# Patient Record
Sex: FEMALE | Race: WHITE | NOT HISPANIC OR LATINO | Employment: FULL TIME | URBAN - NONMETROPOLITAN AREA
[De-identification: names, ages, dates, MRNs, and addresses within clinical notes are randomized per-mention and may not be internally consistent; named-entity substitution may affect disease eponyms.]

---

## 2017-01-19 ENCOUNTER — OFFICE VISIT (OUTPATIENT)
Dept: ORTHOPEDIC SURGERY | Facility: CLINIC | Age: 42
End: 2017-01-19

## 2017-01-19 DIAGNOSIS — S82.892D CLOSED LEFT ANKLE FRACTURE, WITH ROUTINE HEALING, SUBSEQUENT ENCOUNTER: ICD-10-CM

## 2017-01-19 DIAGNOSIS — S82.892D ANKLE FRACTURE, LEFT, CLOSED, WITH ROUTINE HEALING, SUBSEQUENT ENCOUNTER: Primary | ICD-10-CM

## 2017-01-19 PROCEDURE — 73600 X-RAY EXAM OF ANKLE: CPT | Performed by: ORTHOPAEDIC SURGERY

## 2017-01-19 PROCEDURE — 99024 POSTOP FOLLOW-UP VISIT: CPT | Performed by: ORTHOPAEDIC SURGERY

## 2017-01-19 RX ORDER — BACLOFEN 10 MG/1
10 TABLET ORAL 2 TIMES DAILY
Qty: 60 TABLET | Refills: 0 | Status: SHIPPED | OUTPATIENT
Start: 2017-01-19

## 2017-01-19 NOTE — PROGRESS NOTES
Chief Complaint   Patient presents with   • Left Ankle - Follow-up   • Wound Check         HPI  Patient is here today for a follow up of hr left ankle fracture and incisional wound.  She has a minimal amount of drainage in the center of the lateral incision.  She is extremely tender there also.  Overall the patient is doing reasonably well at this point.  Her pain is starting to get a little bit better and the swelling and bruising of gone down considerably.  There is one small area of incision that is not quite healed completely it is at the level of the syndesmosis and possibly the site of maximal swelling and stretching of the skin at the time of the trauma.        There were no vitals filed for this visit.        Joint/Body Part Specific Exam: Left ankle:  Patient is post ORIF of the ankle 8 week(s). Incisions are clean and healing nicely both medially and laterally. Ankle mortise is stable. There is some stiffness at the ankle consistent with the post-surgical and post-   immobilization status of the patient. There is no clinical deformity. Homans   sign is negative. Calf is soft and nontender. Patient is moving their toes well.   Local tenderness is consistent with a healing fracture. Dorsalis pedis artery   pulses are palpable. No hardware related problems are noted.        X-RAY REPORT:  left Ankle X-Ray  Indication: Evaluation of healing of the ankle fracture after ORIF  Views: AP, Lateral, Mortise  Findings: Well reduced ankle fracture anatomically speaking  yes fracture  no bony lesion  Soft tissues within normal limits  within normal limits joint spaces  Hardware appropriately positioned yes    yes prior studies available for comparison.    X-RAY was ordered and reviewed by Db Wing MD        Dev was seen today for wound check and follow-up.    Diagnoses and all orders for this visit:    Ankle fracture, left, closed, with routine healing, subsequent encounter    Closed left ankle fracture, with  routine healing, subsequent encounter          Procedures        Instructions:      Plan:    Incision care with aqua cell dressing.    Elevation to control swelling.    Ace wrap from toes to groin.    Note for physical therapy given to the patient including the use of Thera-Band exercises.    Tablet baclofen 10 mg tab 1 by mouth twice a day when necessary spasms in the calf and the hamstring muscles.    Possibility of removal of the syndesmotic screw discussed with the patient and her  to improve dorsiflexion of the ankle and to promote healing of the one area of incision that is not completely healed.    She has completed her course of doxycycline for antibiotic prophylaxis.    Patient is given a note to be off work at this point.    She can start weightbearing as tolerated.    Falls precaution.    Tablet aspirin 325 mg tab 1 by mouth daily for DVT prophylaxis.    Follow-up in my office in 6 weeks for reevaluation.    Controlled substance treatment options discussed in detail. Patient's signed consent to medical options on file. NANETTE form in chart. Risks of narcotic medication usage outlined.  Possibility of physical and psychological dependence and abuse, especially long term, emphasized to the patient.

## 2017-01-19 NOTE — MR AVS SNAPSHOT
Dev Ramos   1/19/2017 9:30 AM   Office Visit    Dept Phone:  [phone]   Encounter #:  [id number]    Provider:  Db Wing MD   Department:  [facility]                Your Full Care Plan              Today's Medication Changes          These changes are accurate as of: 1/19/17 10:10 AM.  If you have any questions, ask your nurse or doctor.               New Medication(s)Ordered:     baclofen 10 MG tablet   Commonly known as:  LIORESAL   Take 1 tablet by mouth 2 (Two) Times a Day.   Started by:  Db Wing MD            Where to Get Your Medications      These medications were sent to [facility] [address] [place] [address] - [address] - [phone]  - [phone]   [address] [place] [address]     Phone:  [phone]     baclofen 10 MG tablet                  Your Updated Medication List          This list is accurate as of: 1/19/17 10:10 AM.  Always use your most recent med list.                baclofen 10 MG tablet   Commonly known as:  LIORESAL   Take 1 tablet by mouth 2 (Two) Times a Day.       cyclobenzaprine 10 MG tablet   Commonly known as:  FLEXERIL   Take 1 tablet by mouth Every 12 (Twelve) Hours As Needed for muscle spasms.       oxyCODONE-acetaminophen 5-325 MG per tablet   Commonly known as:  PERCOCET   1 Oral Q12H PRN severe pain       sulfamethoxazole-trimethoprim 800-160 MG per tablet   Commonly known as:  BACTRIM DS   Take 1 tablet by mouth 2 (Two) Times a Day.               We Performed the Following     XR Ankle 2 View Left       You Were Diagnosed With        Codes Comments    Ankle fracture, left, closed, with routine healing, subsequent encounter    -  Primary ICD-10-CM: S82.892D  ICD-9-CM: V54.19     Closed left ankle fracture, with routine healing, subsequent encounter     ICD-10-CM: S82.892D  ICD-9-CM: V54.19       Instructions     None    Patient Instructions  History      Upcoming Appointments     Visit Type Date Time Department    FOLLOW UP 2017  9:30 AM [facility]    FOLLOW UP 3/2/2017  9:30 AM [facility]      [employer] Signup     [facility] [employer] allows you to send messages to your doctor, view your test results, renew your prescriptions, schedule appointments, and more. To sign up, go to [employer] and click on the Sign Up Now link in the New User? box. Enter your [employer] Activation Code exactly as it appears below along with the last four digits of your Social Security Number and your Date of Birth () to complete the sign-up process. If you do not sign up before the expiration date, you must request a new code.    [employer] Activation Code: [email or web address]  Expires: 2017 10:10 AM    If you have questions, you can email [email or web address] or call [phone] to talk to our [employer] staff. Remember, [employer] is NOT to be used for urgent needs. For medical emergencies, dial 911.               Other Info from Your Visit           Your Appointments     Mar 02, 2017  9:30 AM EST   Follow Up with Db Wing MD   [facility] (--)    [address] [place] [address]   [phone]           Arrive 15 minutes prior to appointment.              Allergies     No Known Allergies      Reason for Visit     Left Ankle - Follow-up     Wound Check           Vital Signs     Smoking Status                   Never Smoker           Problems and Diagnoses Noted     Fracture of left ankle    Broken ankle      Results     XR Ankle 2 View Left

## 2017-02-01 NOTE — TELEPHONE ENCOUNTER
Please have a Script written and printed. Please have patient come in and pick it up tablet Norco 5/325 mg tab 1 by mouth every 6 hours when necessary pain total 40 pills no refills thanks.  SM

## 2017-02-02 RX ORDER — HYDROCODONE BITARTRATE AND ACETAMINOPHEN 5; 325 MG/1; MG/1
1 TABLET ORAL EVERY 6 HOURS PRN
Qty: 40 TABLET | Refills: 0 | Status: SHIPPED | OUTPATIENT
Start: 2017-02-02

## 2017-02-06 ENCOUNTER — TELEPHONE (OUTPATIENT)
Dept: ORTHOPEDIC SURGERY | Facility: CLINIC | Age: 42
End: 2017-02-06

## 2017-02-06 NOTE — TELEPHONE ENCOUNTER
PER DAVID BUSTILLO PATIENT'S INCISION IS STILL NOT HEALED AND PATIENT IS REQUESTING SCREW BE REMOVED PLEASE PUT IN THE SX ORDER.

## 2017-02-08 ENCOUNTER — PREP FOR SURGERY (OUTPATIENT)
Dept: ORTHOPEDIC SURGERY | Facility: CLINIC | Age: 42
End: 2017-02-08

## 2017-02-08 DIAGNOSIS — S82.892S: Primary | ICD-10-CM

## 2017-02-15 ENCOUNTER — DOCUMENTATION (OUTPATIENT)
Dept: ORTHOPEDIC SURGERY | Facility: CLINIC | Age: 42
End: 2017-02-15

## 2017-02-15 RX ORDER — SULFAMETHOXAZOLE AND TRIMETHOPRIM 800; 160 MG/1; MG/1
1 TABLET ORAL 2 TIMES DAILY
Qty: 30 TABLET | Refills: 0 | Status: SHIPPED | OUTPATIENT
Start: 2017-02-15

## 2017-02-15 NOTE — PROGRESS NOTES
Patient came in today for a wound check.  The area is beginning to show redness.  It also looks like it is getting larger in size.  Upon review with Dr. Wing, he has sent orders to [facility] to schedule screw removal and at that time he will clean out the area.  He also ordered Bactrim for the patient.  It has been sent to [facility] pharmacy.  The wound was redressed and patient will continue to dress it until surgery.

## 2017-02-27 ENCOUNTER — OUTSIDE FACILITY SERVICE (OUTPATIENT)
Dept: ORTHOPEDIC SURGERY | Facility: CLINIC | Age: 42
End: 2017-02-27

## 2017-02-27 PROCEDURE — 20680 REMOVAL OF IMPLANT DEEP: CPT | Performed by: ORTHOPAEDIC SURGERY

## 2017-03-02 ENCOUNTER — OFFICE VISIT (OUTPATIENT)
Dept: ORTHOPEDIC SURGERY | Facility: CLINIC | Age: 42
End: 2017-03-02

## 2017-03-02 DIAGNOSIS — S82.892D CLOSED LEFT ANKLE FRACTURE, WITH ROUTINE HEALING, SUBSEQUENT ENCOUNTER: Primary | ICD-10-CM

## 2017-03-02 PROCEDURE — 99024 POSTOP FOLLOW-UP VISIT: CPT | Performed by: ORTHOPAEDIC SURGERY

## 2017-03-02 NOTE — PROGRESS NOTES
Chief Complaint   Patient presents with   • Left Ankle - Follow-up   • Wound Check         HPI the patient is here today for a follow up of her left ankle she had hardware removed on 2/27/17 when we removed a single syndesmotic screw and perform a secondary closure on the wound dehiscence.  No fevers or chills reported by patient.  She feels like her range of motion is improved significantly.  She feels the ankle is not bound down by the syndesmotic fixation anymore.  She is pleased with her outcome.        There were no vitals filed for this visit.        Joint/Body Part Specific Exam:  Left ankle: Patient is 3 days status post removal of his syndesmotic screw.  She is neurovascularly intact.  Incision is clean.  Dorsiflexion 0-20° and plantar flexion 0-30°.  She already feels that the ankle range of motion is more free and her range of motion is better especially in dorsiflexion and rotation after the syndesmotic screw removal.  Homans sign is negative.  There is no clinical evidence of a DVT or an infection.      X-RAY REPORT:        Dev was seen today for wound check and follow-up.    Diagnoses and all orders for this visit:    Closed left ankle fracture, with routine healing, subsequent encounter          Procedures        Instructions:      Plan: Incision care.    Elevation to control swelling.    Protected weightbearing with the use of crutches or a cane.    Patient already has a prescription of Percocet.    Ace wrap from toes to groin to help decrease the swelling and decrease the possibility of a DVT.    Continue with active gentle mobilization of the ankle including circumduction.    Tablet aspirin 325 mg tab 1 by mouth daily for DVT prophylaxis.    Follow-up in my office in 6 weeks for reevaluation and possible removal of sutures.

## 2017-03-16 ENCOUNTER — OFFICE VISIT (OUTPATIENT)
Dept: ORTHOPEDIC SURGERY | Facility: CLINIC | Age: 42
End: 2017-03-16

## 2017-03-16 DIAGNOSIS — S82.892D CLOSED LEFT ANKLE FRACTURE, WITH ROUTINE HEALING, SUBSEQUENT ENCOUNTER: Primary | ICD-10-CM

## 2017-03-16 PROCEDURE — 99024 POSTOP FOLLOW-UP VISIT: CPT | Performed by: ORTHOPAEDIC SURGERY

## 2017-03-16 NOTE — PROGRESS NOTES
Chief Complaint   Patient presents with   • Left Ankle - Follow-up         HPI  The patient is here today for a follow up of her left ankle. She had hardware removal on 2/27/17.  Patient feels a lot better after the removal of the syndesmotic screw.  Her range of motion is improved significantly.  She feels that the ankle is a lot more free and then she can mobilize it much more extensively and it has helped to improve her gait pattern as well.  She does not have any complications from the surgical intervention where the syndesmotic screw was removed to a half weeks ago.  There were no vitals filed for this visit.        Joint/Body Part Specific Exam:  Left ankle: Incisions are clean fully healed dorsiflexion 0-30° and this is about 10° more than the contralateral side.  Plantar flexion 0-40° which is also 10° more than the contralateral side.  She is able to circumduct the ankle without too much difficulty.  Neurovascular status is intact.  There is no clinical deformity.  The syndesmosis is stable.  No hardware related problems are noted.      X-RAY REPORT:        Dev was seen today for follow-up.    Diagnoses and all orders for this visit:    Closed left ankle fracture, with routine healing, subsequent encounter          Procedures        Instructions:      Plan: Incision care.    Elevation to control swelling.    Home-based exercise program.    Weight bearing as tolerated.    DC the sutures today in the office.    Okay to return to her job at [employer] [place].  Restrictions will be 4 hours a day and the patient is instructed to elevate the lower extremity to minimize swelling every shift for about 20-30 minutes.    She is not asking me for any pain medication at this point.    Follow-up in 6 weeks.

## 2017-03-23 ENCOUNTER — TELEPHONE (OUTPATIENT)
Dept: ORTHOPEDIC SURGERY | Facility: CLINIC | Age: 42
End: 2017-03-23

## 2017-03-23 RX ORDER — SULFAMETHOXAZOLE AND TRIMETHOPRIM 800; 160 MG/1; MG/1
1 TABLET ORAL 2 TIMES DAILY
Qty: 30 TABLET | Refills: 0 | Status: SHIPPED | OUTPATIENT
Start: 2017-03-23

## 2017-03-23 RX ORDER — FLUCONAZOLE 150 MG/1
150 TABLET ORAL ONCE
Qty: 3 TABLET | Refills: 0 | Status: SHIPPED | OUTPATIENT
Start: 2017-03-23 | End: 2017-03-23

## 2017-03-23 NOTE — TELEPHONE ENCOUNTER
Patient came into the office.  Her incision from the screw removal has scabbed over and there is redness surrounding it.  The scab was removed and an antimicrobial dressing with silver applied.  Bactrim has been refilled for the patient to prevent infection. Patient will remove the dressing on Sunday and report findings on Monday.

## 2017-04-19 ENCOUNTER — TELEPHONE (OUTPATIENT)
Dept: ORTHOPEDIC SURGERY | Facility: CLINIC | Age: 42
End: 2017-04-19

## 2017-04-20 ENCOUNTER — TELEPHONE (OUTPATIENT)
Dept: ORTHOPEDIC SURGERY | Facility: CLINIC | Age: 42
End: 2017-04-20

## 2017-04-20 RX ORDER — HYDROCODONE BITARTRATE AND ACETAMINOPHEN 5; 325 MG/1; MG/1
1 TABLET ORAL EVERY 12 HOURS PRN
Qty: 30 TABLET | Refills: 0 | Status: SHIPPED | OUTPATIENT
Start: 2017-04-20

## 2017-04-20 NOTE — TELEPHONE ENCOUNTER
Patient has been set up with wound care at [facility] with Gina Chavez.  Patient is scheduled for 04/25/17 at 11:30 a.m. She has been instructed that she will need a [employer] referral from her PCP.

## 2017-04-27 ENCOUNTER — OFFICE VISIT (OUTPATIENT)
Dept: ORTHOPEDIC SURGERY | Facility: CLINIC | Age: 42
End: 2017-04-27

## 2017-04-27 DIAGNOSIS — S82.892D CLOSED LEFT ANKLE FRACTURE, WITH ROUTINE HEALING, SUBSEQUENT ENCOUNTER: Primary | ICD-10-CM

## 2017-04-27 PROCEDURE — 99213 OFFICE O/P EST LOW 20 MIN: CPT | Performed by: ORTHOPAEDIC SURGERY

## 2017-04-27 PROCEDURE — 73600 X-RAY EXAM OF ANKLE: CPT | Performed by: ORTHOPAEDIC SURGERY

## 2017-05-01 ENCOUNTER — OFFICE VISIT (OUTPATIENT)
Dept: ORTHOPEDIC SURGERY | Facility: CLINIC | Age: 42
End: 2017-05-01

## 2017-05-01 DIAGNOSIS — S82.892D ANKLE FRACTURE, LEFT, CLOSED, WITH ROUTINE HEALING, SUBSEQUENT ENCOUNTER: Primary | ICD-10-CM

## 2017-05-01 DIAGNOSIS — T81.31XD WOUND DEHISCENCE, SUBSEQUENT ENCOUNTER: ICD-10-CM

## 2017-05-01 PROCEDURE — 99024 POSTOP FOLLOW-UP VISIT: CPT | Performed by: ORTHOPAEDIC SURGERY

## 2017-05-01 RX ORDER — TRAZODONE HYDROCHLORIDE 50 MG/1
TABLET ORAL
COMMUNITY
Start: 2017-04-20

## 2017-05-01 RX ORDER — FLUCONAZOLE 150 MG/1
150 TABLET ORAL ONCE
Qty: 3 TABLET | Refills: 0 | Status: SHIPPED | OUTPATIENT
Start: 2017-05-01 | End: 2017-05-01

## 2017-05-01 RX ORDER — TRIAMTERENE AND HYDROCHLOROTHIAZIDE 37.5; 25 MG/1; MG/1
CAPSULE ORAL
COMMUNITY
Start: 2017-04-10

## 2017-05-01 RX ORDER — HYDROCODONE BITARTRATE AND ACETAMINOPHEN 5; 325 MG/1; MG/1
1 TABLET ORAL EVERY 6 HOURS PRN
Qty: 40 TABLET | Refills: 0 | Status: SHIPPED | OUTPATIENT
Start: 2017-05-01

## 2017-05-01 RX ORDER — SULFAMETHOXAZOLE AND TRIMETHOPRIM 800; 160 MG/1; MG/1
1 TABLET ORAL 2 TIMES DAILY
Qty: 40 TABLET | Refills: 0 | COMMUNITY

## 2017-05-01 RX ORDER — FLUOXETINE HYDROCHLORIDE 20 MG/1
CAPSULE ORAL
COMMUNITY
Start: 2017-04-10

## 2017-05-01 RX ORDER — ONDANSETRON 4 MG/1
TABLET, FILM COATED ORAL
COMMUNITY
Start: 2017-02-27

## 2017-05-01 RX ORDER — LEVOFLOXACIN 500 MG/1
500 TABLET, FILM COATED ORAL DAILY
Qty: 20 TABLET | Refills: 0 | Status: SHIPPED | OUTPATIENT
Start: 2017-05-01 | End: 2017-05-17 | Stop reason: SDUPTHER

## 2017-05-04 ENCOUNTER — OUTSIDE FACILITY SERVICE (OUTPATIENT)
Dept: ORTHOPEDIC SURGERY | Facility: CLINIC | Age: 42
End: 2017-05-04

## 2017-05-04 PROCEDURE — 20680 REMOVAL OF IMPLANT DEEP: CPT | Performed by: ORTHOPAEDIC SURGERY

## 2017-05-11 ENCOUNTER — OFFICE VISIT (OUTPATIENT)
Dept: ORTHOPEDIC SURGERY | Facility: CLINIC | Age: 42
End: 2017-05-11

## 2017-05-11 DIAGNOSIS — S82.892D CLOSED LEFT ANKLE FRACTURE, WITH ROUTINE HEALING, SUBSEQUENT ENCOUNTER: Primary | ICD-10-CM

## 2017-05-11 DIAGNOSIS — T81.31XD WOUND DEHISCENCE, SUBSEQUENT ENCOUNTER: ICD-10-CM

## 2017-05-11 DIAGNOSIS — S82.892D ANKLE FRACTURE, LEFT, CLOSED, WITH ROUTINE HEALING, SUBSEQUENT ENCOUNTER: ICD-10-CM

## 2017-05-11 PROCEDURE — 99024 POSTOP FOLLOW-UP VISIT: CPT | Performed by: ORTHOPAEDIC SURGERY

## 2017-05-12 PROBLEM — T81.30XA WOUND DEHISCENCE: Status: ACTIVE | Noted: 2017-05-12

## 2017-05-18 RX ORDER — SULFAMETHOXAZOLE AND TRIMETHOPRIM 800; 160 MG/1; MG/1
TABLET ORAL
Qty: 40 TABLET | Refills: 0 | Status: SHIPPED | OUTPATIENT
Start: 2017-05-18

## 2017-05-18 RX ORDER — LEVOFLOXACIN 500 MG/1
TABLET, FILM COATED ORAL
Qty: 20 TABLET | Refills: 0 | Status: SHIPPED | OUTPATIENT
Start: 2017-05-18

## 2017-05-25 ENCOUNTER — OFFICE VISIT (OUTPATIENT)
Dept: ORTHOPEDIC SURGERY | Facility: CLINIC | Age: 42
End: 2017-05-25

## 2017-05-25 DIAGNOSIS — T81.31XD WOUND DEHISCENCE, SUBSEQUENT ENCOUNTER: Primary | ICD-10-CM

## 2017-05-25 DIAGNOSIS — S82.892D CLOSED LEFT ANKLE FRACTURE, WITH ROUTINE HEALING, SUBSEQUENT ENCOUNTER: ICD-10-CM

## 2017-05-25 PROCEDURE — 99024 POSTOP FOLLOW-UP VISIT: CPT | Performed by: ORTHOPAEDIC SURGERY

## 2017-06-08 ENCOUNTER — TELEPHONE (OUTPATIENT)
Dept: ORTHOPEDIC SURGERY | Facility: CLINIC | Age: 42
End: 2017-06-08

## 2017-06-08 NOTE — TELEPHONE ENCOUNTER
PATIENT CAME IN OFFICE AND REQUESTED A WORK NOTE STATING THAT SHE CAN ONLY WORK 4 TO 6 HOURS AT A TIME INSTEAD OF 6 HOURS DUE TO HER WOUND OPENING BACK UP.  I GAVE PATIENT NEW WORK NOTE WITH THOSE RESTRICTIONS.

## 2017-06-22 ENCOUNTER — OFFICE VISIT (OUTPATIENT)
Dept: ORTHOPEDIC SURGERY | Facility: CLINIC | Age: 42
End: 2017-06-22

## 2017-06-22 DIAGNOSIS — S82.892D ANKLE FRACTURE, LEFT, CLOSED, WITH ROUTINE HEALING, SUBSEQUENT ENCOUNTER: ICD-10-CM

## 2017-06-22 DIAGNOSIS — T81.31XD WOUND DEHISCENCE, SUBSEQUENT ENCOUNTER: Primary | ICD-10-CM

## 2017-06-22 PROCEDURE — 99024 POSTOP FOLLOW-UP VISIT: CPT | Performed by: ORTHOPAEDIC SURGERY

## 2017-06-22 RX ORDER — OMEPRAZOLE 40 MG/1
CAPSULE, DELAYED RELEASE ORAL
COMMUNITY
Start: 2017-06-05

## 2017-06-22 RX ORDER — FLUOXETINE HYDROCHLORIDE 40 MG/1
CAPSULE ORAL
COMMUNITY
Start: 2017-06-05

## 2017-06-22 RX ORDER — POTASSIUM CHLORIDE 750 MG/1
TABLET, EXTENDED RELEASE ORAL
COMMUNITY
Start: 2017-06-08

## 2017-06-22 NOTE — PROGRESS NOTES
Chief Complaint   Patient presents with   • Left Ankle - Follow-up   • Wound Check         HPI  Patient returns today for a post operative wound check.Patient had the lateral ankle hardware removed on 4 May 2017.  The wound continues to heal.  She has been going to [facility]. Overall the patient is doing well.  She does not have any significant pain around the ankle.  The wound continues to heal albeit slowly.  She does not have any issues with her ankle fracture at this point.  The patient has returned back to work and is continuing to elevate her lower extremity to decrease the swelling and edema.        There were no vitals filed for this visit.        Joint/Body Part Specific Exam:Left ankle: Patient has a area of granulation tissue about 3 cm in length and 1 cm wide.  This is at the middle of the incision where the lateral ankle hardware was removed.  She does not have any purulence.  There is no local drainage.  There is no evidence of cellulitis.  There is mild erythema around the wound margins.  There is no clinical deformity.  Ankle dorsiflexion 0-30°.  Ankle plantar flexion 0-40°.  She is able to circumduct her ankle without any significant difficulty.    X-RAY REPORT:        Dev was seen today for wound check and follow-up.    Diagnoses and all orders for this visit:    Wound dehiscence, subsequent encounter    Ankle fracture, left, closed, with routine healing, subsequent encounter          Procedures        Instructions:      Plan:Incision care with aqua cell dressing changes.    Elevation to control swelling.    Patient has been counseled to cut back on her smoking.    Home-based ankle exercise program with stretching and strengthening of the dorsi and plantar flexors.    She is not asking me for any pain medication at this point.    Weightbearing as tolerated.    Patient will be seen both in the wound clinic as well as in our office until this wound dehiscence has fully  resolved.    Follow-up in my office in 4 weeks.

## 2017-07-13 ENCOUNTER — OFFICE VISIT (OUTPATIENT)
Dept: ORTHOPEDIC SURGERY | Facility: CLINIC | Age: 42
End: 2017-07-13

## 2017-07-13 DIAGNOSIS — Z98.890 STATUS POST HARDWARE REMOVAL: Primary | ICD-10-CM

## 2017-07-13 DIAGNOSIS — S82.892D CLOSED LEFT ANKLE FRACTURE, WITH ROUTINE HEALING, SUBSEQUENT ENCOUNTER: ICD-10-CM

## 2017-07-13 DIAGNOSIS — T81.31XD WOUND DEHISCENCE, SUBSEQUENT ENCOUNTER: ICD-10-CM

## 2017-07-13 PROCEDURE — 99213 OFFICE O/P EST LOW 20 MIN: CPT | Performed by: ORTHOPAEDIC SURGERY

## 2017-07-13 PROCEDURE — 73600 X-RAY EXAM OF ANKLE: CPT | Performed by: ORTHOPAEDIC SURGERY

## 2017-07-13 RX ORDER — ALBUTEROL SULFATE 90 UG/1
AEROSOL, METERED RESPIRATORY (INHALATION)
COMMUNITY
Start: 2017-06-22

## 2017-07-13 NOTE — PROGRESS NOTES
Chief Complaint   Patient presents with   • Left Ankle - Follow-up   • Post-op     LEFT ANKLE PLATE REMOVAL   Patient is here today following up after left ankle plate removal.      HPI patient is doing quite well in terms of wound healing.  The dehiscence of the wound laterally has fully resolved.  She does not have a clinical deformity.  The ankle mortise is stable.  External rotation and squeeze tests are negative.  He is ambulating without the assistance of a cane or walker.  She does not use her cam walking boot at this point because her fracture has consolidated nicely.  Patient is wanting to go back to work in early August on a full-time basis.        There were no vitals filed for this visit.        Joint/Body Part Specific Exam:  Patient is post ORIF of the ankle 9 month(s) and 2 months status post removal of the lateral plate.. Incisions are clean and healing nicely both medially and laterally. Ankle mortise is stable. There is some stiffness at the ankle consistent with the post-surgical and post-   immobilization status of the patient. There is no clinical deformity. Homans   sign is negative. Calf is soft and nontender. Patient is moving their toes well.   Local tenderness is consistent with a healing fracture. Dorsalis pedis artery   pulses are palpable. No hardware related problems are noted.  The wound dehiscence is completely settled down.  There is no clinical deformity.  There is no local drainage.  No purulence is noted.  No abscess formation is noted.        X-RAY REPORT:  left Ankle X-Ray  Indication: Evaluation of healing of an ankle fracture after the lateral plate removal following ORIF  Views: AP, Lateral  Findings: Healed lateral fracture.  Syndesmosis is stable.  Medial malleolus fracture is healed.  yes fracture  no bony lesion  Soft tissues within normal limits  within normal limits joint spaces  Hardware appropriately positioned yes    yes prior studies available for  comparison.    X-RAY was ordered and reviewed by Db Wing MD        Dev was seen today for post-op and follow-up.    Diagnoses and all orders for this visit:    Status post hardware removal  -     XR Ankle 2 View Left    Closed left ankle fracture, with routine healing, subsequent encounter    Wound dehiscence, subsequent encounter          Procedures        Instructions:      Plan:Incision care.    The wound has completely healed laterally.    She has been released from the wound care clinic.    Elevation to control swelling.    Tablet ibuprofen 600 mg tab 1 by mouth twice a day when necessary pain and discomfort.    Patient wants to return to her job full-time with [employer] from 7 August 2017.  Note written for this for the patient.    Calcium and vitamin D for bone health.    Cut back on the use of smoking and nicotine.    Falls precautions.    Follow-up in 3 months.

## 2017-10-19 ENCOUNTER — OFFICE VISIT (OUTPATIENT)
Dept: ORTHOPEDIC SURGERY | Facility: CLINIC | Age: 42
End: 2017-10-19

## 2017-10-19 DIAGNOSIS — S82.892D CLOSED FRACTURE OF LEFT ANKLE WITH ROUTINE HEALING, SUBSEQUENT ENCOUNTER: Primary | ICD-10-CM

## 2017-10-19 PROCEDURE — 99213 OFFICE O/P EST LOW 20 MIN: CPT | Performed by: ORTHOPAEDIC SURGERY

## 2017-10-19 PROCEDURE — 73600 X-RAY EXAM OF ANKLE: CPT | Performed by: ORTHOPAEDIC SURGERY

## 2018-04-19 ENCOUNTER — OFFICE VISIT (OUTPATIENT)
Dept: ORTHOPEDIC SURGERY | Facility: CLINIC | Age: 43
End: 2018-04-19

## 2018-04-19 VITALS — TEMPERATURE: 97.8 F | WEIGHT: 268 LBS | BODY MASS INDEX: 42.06 KG/M2 | HEIGHT: 67 IN

## 2018-04-19 DIAGNOSIS — S82.892D CLOSED FRACTURE OF LEFT ANKLE WITH ROUTINE HEALING, SUBSEQUENT ENCOUNTER: Primary | ICD-10-CM

## 2018-04-19 PROCEDURE — 73600 X-RAY EXAM OF ANKLE: CPT | Performed by: ORTHOPAEDIC SURGERY

## 2018-04-19 PROCEDURE — 99213 OFFICE O/P EST LOW 20 MIN: CPT | Performed by: ORTHOPAEDIC SURGERY

## 2018-04-19 NOTE — PROGRESS NOTES
Chief Complaint   Patient presents with   • Left Ankle - Follow-up   Patient is here today following up on her left ankle fracture.         HPI  Patient is following up on a displaced trimalleolar ankle fracture which is now 1-1/2 years out from the injury and subsequent surgery.  She had the injury in 2016.  She has undergone removal of the plates and screws from the lateral side of the ankle.  The medial side does not bother her at all.  Her ankle range of motion is improved significantly.  Patient has gone back to work full-time and states that she is doing quite well at this point.  She does have some deep aching sensation every now and then especially when she has been on her feet for a prolonged period of time.  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      There were no vitals filed for this visit.        Review of Systems   Constitutional: Negative.    HENT: Negative.    Respiratory: Negative.    Cardiovascular: Negative.    Gastrointestinal: Negative.    Endocrine: Negative.    Genitourinary: Negative.    Musculoskeletal: Positive for gait problem and joint swelling.   Skin: Negative.    Allergic/Immunologic: Negative.    Hematological: Negative.    Psychiatric/Behavioral: Negative.            Physical Exam   Constitutional: She is oriented to person, place, and time. She appears well-nourished.   HENT:   Head: Atraumatic.   Eyes: EOM are normal.   Neck: Neck supple.   Cardiovascular: Normal rate and regular rhythm.    Pulmonary/Chest: Effort normal and breath sounds normal.   Abdominal: Bowel sounds are normal.   Musculoskeletal: She exhibits edema, tenderness and deformity.   Neurological: She is alert and oriented to person, place, and time. She has normal reflexes.   Skin: Skin is warm. Capillary refill takes 2 to 3 seconds.   Psychiatric: Her behavior is normal. Judgment and thought content normal.   Vitals reviewed.          Joint/Body  Part Specific Exam:  Patient is post ORIF of the ankle 1.5 year(s). Incisions are clean and healing nicely both medially and laterally.   Ankle mortise is stable.  Dorsiflexion is 0-10°.  Plantar flexion is 0-30°.  She is able to circumduct the ankle without any difficulty.  There is some stiffness at the ankle consistent with the post-surgical and post-   immobilization status of the patient.   There is no clinical deformity.   Homans sign is negative.  There is no clinical evidence of a DVT.  Calf is soft and nontender.   Patient is moving their toes well.   Local tenderness is consistent with a healing fracture.   Dorsalis pedis artery   pulses are palpable.   No hardware related problems are noted.    No fracture blisters are present.        X-RAY Report:  left Ankle X-Ray  Indication: Evaluation of healing of her fracture of the distal fibula  Views: AP, Lateral  Findings: Anatomically reduced fracture with slight concentric narrowing of the tibiotalar articulation  yes fracture  no bony lesion  Soft tissues within normal limits  decreased joint spaces  Hardware appropriately positioned yes    yes prior studies available for comparison.    X-RAY was ordered and reviewed by Db Wing MD        Diagnostics:        Dev was seen today for follow-up.    Diagnoses and all orders for this visit:    Closed fracture of left ankle with routine healing, subsequent encounter  -     XR Ankle 2 View Left            Procedures        Plan:  Weightbearing as tolerated with stretching and sensing exercises of the dorsi and plantar flexors of the ankle.    Tablet ibuprofen 600 mg orally twice a day for pain swelling and discomfort.    Use a supportive active ankle brace to protect the ankle from repeat injury.    Jobst compression garment stocking to help minimize the swelling and possibility of DVT.    Calcium and vitamin D for bone health.    Intra-articular steroid injection would be an option for this patient if she  continues to have pain and discomfort from the degenerative process.    Follow-up in my office in 6 months for reevaluation

## 2018-10-18 ENCOUNTER — OFFICE VISIT (OUTPATIENT)
Dept: ORTHOPEDIC SURGERY | Facility: CLINIC | Age: 43
End: 2018-10-18

## 2018-10-18 DIAGNOSIS — S82.892D CLOSED FRACTURE OF LEFT ANKLE WITH ROUTINE HEALING, SUBSEQUENT ENCOUNTER: Primary | ICD-10-CM

## 2018-10-18 PROCEDURE — 99213 OFFICE O/P EST LOW 20 MIN: CPT | Performed by: ORTHOPAEDIC SURGERY

## 2018-10-18 NOTE — PROGRESS NOTES
Chief Complaint   Patient presents with   • Left Ankle - Follow-up   This patient is here today following up on her left ankle.        HPI the patient has done very well with her ankle.  The fracture is completely healed.  There is no drainage locally from the incisions.  She does not have any hardware related problems.  He is very pleased with her outcome.  She is gone back to work full-time without any issues related to her ankle.  Unfortunately she is developing a hallux valgus deformity of the forefoot.  This deformity of the MTP joint is causing the patient to have pronation of the hindfoot.  She is also developing incompetence of the posterior tibial tendon at this point.  This is affecting her gait in a negative sense.  I would recommend that she see a foot and ankle specialist to determine if there is any possibility of surgical correction of the posterior tibial tendon as well as the hallux valgus to improve her gait patterns.        There were no vitals filed for this visit.        Review of Systems   Constitutional: Negative.    HENT: Negative.    Eyes: Negative.    Respiratory: Negative.    Cardiovascular: Negative.    Gastrointestinal: Negative.    Endocrine: Negative.    Genitourinary: Negative.    Musculoskeletal: Positive for gait problem and joint swelling.   Skin: Negative.    Allergic/Immunologic: Negative.    Hematological: Negative.    Psychiatric/Behavioral: Negative.            Physical Exam   Constitutional: She is oriented to person, place, and time. She appears well-nourished.   HENT:   Head: Atraumatic.   Eyes: EOM are normal.   Neck: Neck supple.   Cardiovascular: Intact distal pulses.    Pulmonary/Chest: Breath sounds normal.   Abdominal: Bowel sounds are normal.   Musculoskeletal: She exhibits edema and tenderness.   Neurological: She is alert and oriented to person, place, and time.   Skin: Skin is warm. Capillary refill takes 2 to 3 seconds.   Psychiatric: She has a normal mood and  affect. Her behavior is normal. Thought content normal.   Nursing note and vitals reviewed.          Joint/Body Part Specific Exam:  Left ankle: The incisions are clean fully healed both medially and laterally.  There is no clinical deformity.  The syndesmosis is stable.  Neurovascular status is intact.  Dorsiflexion 0-20°.  Plantar flexion 0-30°.  She is able to circumduct her ankle without too much difficulty.  Refill is 2 seconds with a brisk return.  Left great toe:  The patient has a prominent bunion on the medial aspect of the MTP joint. The deformity is partly correctable with direct pressure. The intermetatarsal angle is somewhat increased. There is some clawing of the 2nd toe. Neurovascular status is intact. There are some calluses on the tip of the plantar aspect of the great toe and the 2nd toe. The patient has significant pronation of the great toe. Gait is cautious and patient tends to walk with hindfoot valgus. Capillary refill is 2 seconds with a brisk return. Sensation is intact to light touch.       X-RAY Report:        Diagnostics:        Dev was seen today for follow-up.    Diagnoses and all orders for this visit:    Closed fracture of left ankle with routine healing, subsequent encounter  -     Ambulatory Referral to Orthopedic Surgery            Procedures        Plan:  Weightbearing as tolerated.    Stretching and strengthening exercises of the ankle.    Use a supportive brace on the ankle to prevent it from buckling and giving out.    I'm going to refer this patient to my partner Dr. BRADLY DICKENS for a specialty foot and ankle consultation regarding the posterior tibial tendon disease that is leading to a flat foot.  She also has hallux valgus deformity which is affecting her gait patterns because of pronation of the forefoot and the hindfoot.    Tablet ibuprofen 600 mg orally twice a day for pain swelling and discomfort.    Follow-up in my office in 6 months for reevaluation.

## 2023-02-22 ENCOUNTER — TRANSCRIBE ORDERS (OUTPATIENT)
Dept: ADMINISTRATIVE | Facility: HOSPITAL | Age: 48
End: 2023-02-22
Payer: COMMERCIAL

## 2023-02-22 ENCOUNTER — LAB (OUTPATIENT)
Dept: LAB | Facility: HOSPITAL | Age: 48
End: 2023-02-22
Payer: COMMERCIAL

## 2023-02-22 DIAGNOSIS — E55.9 VITAMIN D DEFICIENCY: ICD-10-CM

## 2023-02-22 DIAGNOSIS — Z00.00 ROUTINE GENERAL MEDICAL EXAMINATION AT A HEALTH CARE FACILITY: ICD-10-CM

## 2023-02-22 DIAGNOSIS — Z00.00 ROUTINE GENERAL MEDICAL EXAMINATION AT A HEALTH CARE FACILITY: Primary | ICD-10-CM

## 2023-02-22 LAB
25(OH)D3 SERPL-MCNC: 40.4 NG/ML (ref 30–100)
ALBUMIN SERPL-MCNC: 4 G/DL (ref 3.5–5.2)
ALBUMIN/GLOB SERPL: 1.3 G/DL
ALP SERPL-CCNC: 63 U/L (ref 39–117)
ALT SERPL W P-5'-P-CCNC: 38 U/L (ref 1–33)
ANION GAP SERPL CALCULATED.3IONS-SCNC: 8.4 MMOL/L (ref 5–15)
AST SERPL-CCNC: 33 U/L (ref 1–32)
BASOPHILS # BLD AUTO: 0.05 10*3/MM3 (ref 0–0.2)
BASOPHILS NFR BLD AUTO: 0.6 % (ref 0–1.5)
BILIRUB SERPL-MCNC: <0.2 MG/DL (ref 0–1.2)
BUN SERPL-MCNC: 12 MG/DL (ref 6–20)
BUN/CREAT SERPL: 11.1 (ref 7–25)
CALCIUM SPEC-SCNC: 9.4 MG/DL (ref 8.6–10.5)
CHLORIDE SERPL-SCNC: 105 MMOL/L (ref 98–107)
CHOLEST SERPL-MCNC: 182 MG/DL (ref 0–200)
CO2 SERPL-SCNC: 29.6 MMOL/L (ref 22–29)
CREAT SERPL-MCNC: 1.08 MG/DL (ref 0.57–1)
DEPRECATED RDW RBC AUTO: 49.4 FL (ref 37–54)
EGFRCR SERPLBLD CKD-EPI 2021: 63.1 ML/MIN/1.73
EOSINOPHIL # BLD AUTO: 0.38 10*3/MM3 (ref 0–0.4)
EOSINOPHIL NFR BLD AUTO: 4.3 % (ref 0.3–6.2)
ERYTHROCYTE [DISTWIDTH] IN BLOOD BY AUTOMATED COUNT: 16.5 % (ref 12.3–15.4)
GLOBULIN UR ELPH-MCNC: 3.2 GM/DL
GLUCOSE SERPL-MCNC: 104 MG/DL (ref 65–99)
HBA1C MFR BLD: 6.1 % (ref 4.8–5.6)
HCT VFR BLD AUTO: 35 % (ref 34–46.6)
HDLC SERPL-MCNC: 49 MG/DL (ref 40–60)
HGB BLD-MCNC: 10.9 G/DL (ref 12–15.9)
IMM GRANULOCYTES # BLD AUTO: 0.04 10*3/MM3 (ref 0–0.05)
IMM GRANULOCYTES NFR BLD AUTO: 0.5 % (ref 0–0.5)
LDLC SERPL CALC-MCNC: 119 MG/DL (ref 0–100)
LDLC/HDLC SERPL: 2.42 {RATIO}
LYMPHOCYTES # BLD AUTO: 1.97 10*3/MM3 (ref 0.7–3.1)
LYMPHOCYTES NFR BLD AUTO: 22.3 % (ref 19.6–45.3)
MCH RBC QN AUTO: 25.7 PG (ref 26.6–33)
MCHC RBC AUTO-ENTMCNC: 31.1 G/DL (ref 31.5–35.7)
MCV RBC AUTO: 82.5 FL (ref 79–97)
MONOCYTES # BLD AUTO: 0.45 10*3/MM3 (ref 0.1–0.9)
MONOCYTES NFR BLD AUTO: 5.1 % (ref 5–12)
NEUTROPHILS NFR BLD AUTO: 5.96 10*3/MM3 (ref 1.7–7)
NEUTROPHILS NFR BLD AUTO: 67.2 % (ref 42.7–76)
PLATELET # BLD AUTO: 243 10*3/MM3 (ref 140–450)
PMV BLD AUTO: 8.5 FL (ref 6–12)
POTASSIUM SERPL-SCNC: 4.1 MMOL/L (ref 3.5–5.2)
PROT SERPL-MCNC: 7.2 G/DL (ref 6–8.5)
RBC # BLD AUTO: 4.24 10*6/MM3 (ref 3.77–5.28)
SODIUM SERPL-SCNC: 143 MMOL/L (ref 136–145)
TRIGL SERPL-MCNC: 73 MG/DL (ref 0–150)
TSH SERPL DL<=0.05 MIU/L-ACNC: 1.54 UIU/ML (ref 0.27–4.2)
VIT B12 BLD-MCNC: 730 PG/ML (ref 211–946)
VLDLC SERPL-MCNC: 14 MG/DL (ref 5–40)
WBC NRBC COR # BLD: 8.85 10*3/MM3 (ref 3.4–10.8)

## 2023-02-22 PROCEDURE — 36415 COLL VENOUS BLD VENIPUNCTURE: CPT

## 2023-02-22 PROCEDURE — 82306 VITAMIN D 25 HYDROXY: CPT

## 2023-02-22 PROCEDURE — 80050 GENERAL HEALTH PANEL: CPT

## 2023-02-22 PROCEDURE — 80061 LIPID PANEL: CPT

## 2023-02-22 PROCEDURE — 83036 HEMOGLOBIN GLYCOSYLATED A1C: CPT

## 2023-02-22 PROCEDURE — 82607 VITAMIN B-12: CPT

## 2023-02-28 ENCOUNTER — TRANSCRIBE ORDERS (OUTPATIENT)
Dept: ADMINISTRATIVE | Facility: HOSPITAL | Age: 48
End: 2023-02-28
Payer: COMMERCIAL

## 2023-02-28 DIAGNOSIS — R79.89 ELEVATED SERUM CREATININE: ICD-10-CM

## 2023-02-28 DIAGNOSIS — R94.5 ABNORMAL LIVER FUNCTION: Primary | ICD-10-CM

## 2023-03-09 ENCOUNTER — HOSPITAL ENCOUNTER (OUTPATIENT)
Dept: ULTRASOUND IMAGING | Facility: HOSPITAL | Age: 48
Discharge: HOME | End: 2023-03-09
Admitting: FAMILY MEDICINE
Payer: COMMERCIAL

## 2023-03-09 DIAGNOSIS — R79.89 ELEVATED SERUM CREATININE: ICD-10-CM

## 2023-03-09 DIAGNOSIS — R94.5 ABNORMAL LIVER FUNCTION: ICD-10-CM

## 2023-03-09 PROCEDURE — 76700 US EXAM ABDOM COMPLETE: CPT

## 2023-05-05 NOTE — TELEPHONE ENCOUNTER
Patient came in to see if Sola could look at her leg because she is having a lot of pain with it. I explained that Sola was in [place] for an office meeting. She is asking if she can have a refill of pain medicine and also be referred to wound clinic. Please advise/rj.  
Script written and printed. Please have patient come in and pick it up tablet Norco 5/325 mg tab 1 by mouth every 12 when necessary pain total 30 pills.  No refills.  Okay to refer her to a wound clinic as well.      
show

## 2023-08-22 ENCOUNTER — TRANSCRIBE ORDERS (OUTPATIENT)
Dept: ADMINISTRATIVE | Facility: HOSPITAL | Age: 48
End: 2023-08-22
Payer: COMMERCIAL

## 2023-08-22 ENCOUNTER — LAB (OUTPATIENT)
Dept: LAB | Facility: HOSPITAL | Age: 48
End: 2023-08-22
Payer: COMMERCIAL

## 2023-08-22 DIAGNOSIS — E87.6 HYPOKALEMIA: Primary | ICD-10-CM

## 2023-08-22 DIAGNOSIS — E87.6 HYPOKALEMIA: ICD-10-CM

## 2023-08-22 LAB
ANION GAP SERPL CALCULATED.3IONS-SCNC: 10 MMOL/L (ref 5–15)
BUN SERPL-MCNC: 17 MG/DL (ref 6–20)
BUN/CREAT SERPL: 19.3 (ref 7–25)
CALCIUM SPEC-SCNC: 9.6 MG/DL (ref 8.6–10.5)
CHLORIDE SERPL-SCNC: 103 MMOL/L (ref 98–107)
CO2 SERPL-SCNC: 27 MMOL/L (ref 22–29)
CREAT SERPL-MCNC: 0.88 MG/DL (ref 0.57–1)
EGFRCR SERPLBLD CKD-EPI 2021: 80.7 ML/MIN/1.73
GLUCOSE SERPL-MCNC: 109 MG/DL (ref 65–99)
POTASSIUM SERPL-SCNC: 3.4 MMOL/L (ref 3.5–5.2)
SODIUM SERPL-SCNC: 140 MMOL/L (ref 136–145)

## 2023-08-22 PROCEDURE — 36415 COLL VENOUS BLD VENIPUNCTURE: CPT

## 2023-08-22 PROCEDURE — 80048 BASIC METABOLIC PNL TOTAL CA: CPT

## 2023-09-21 ENCOUNTER — LAB (OUTPATIENT)
Dept: LAB | Facility: HOSPITAL | Age: 48
End: 2023-09-21

## 2023-09-21 ENCOUNTER — TRANSCRIBE ORDERS (OUTPATIENT)
Dept: LAB | Facility: HOSPITAL | Age: 48
End: 2023-09-21

## 2023-09-21 DIAGNOSIS — E87.6 HYPOPOTASSEMIA: ICD-10-CM

## 2023-09-21 DIAGNOSIS — E87.6 HYPOPOTASSEMIA: Primary | ICD-10-CM

## 2023-09-21 DIAGNOSIS — Z79.899 ENCOUNTER FOR LONG-TERM (CURRENT) USE OF OTHER MEDICATIONS: ICD-10-CM

## 2023-09-21 LAB
ANION GAP SERPL CALCULATED.3IONS-SCNC: 10.8 MMOL/L (ref 5–15)
BUN SERPL-MCNC: 14 MG/DL (ref 6–20)
BUN/CREAT SERPL: 15.7 (ref 7–25)
CALCIUM SPEC-SCNC: 9.8 MG/DL (ref 8.6–10.5)
CHLORIDE SERPL-SCNC: 100 MMOL/L (ref 98–107)
CO2 SERPL-SCNC: 27.2 MMOL/L (ref 22–29)
CREAT SERPL-MCNC: 0.89 MG/DL (ref 0.57–1)
EGFRCR SERPLBLD CKD-EPI 2021: 79.6 ML/MIN/1.73
GLUCOSE SERPL-MCNC: 94 MG/DL (ref 65–99)
POTASSIUM SERPL-SCNC: 3.2 MMOL/L (ref 3.5–5.2)
SODIUM SERPL-SCNC: 138 MMOL/L (ref 136–145)

## 2023-09-21 PROCEDURE — 80048 BASIC METABOLIC PNL TOTAL CA: CPT

## 2023-09-21 PROCEDURE — 36415 COLL VENOUS BLD VENIPUNCTURE: CPT

## 2024-04-09 ENCOUNTER — LAB (OUTPATIENT)
Dept: LAB | Facility: HOSPITAL | Age: 49
End: 2024-04-09
Payer: COMMERCIAL

## 2024-04-09 ENCOUNTER — TRANSCRIBE ORDERS (OUTPATIENT)
Dept: LAB | Facility: HOSPITAL | Age: 49
End: 2024-04-09
Payer: COMMERCIAL

## 2024-04-09 DIAGNOSIS — E55.9 VITAMIN D DEFICIENCY: ICD-10-CM

## 2024-04-09 DIAGNOSIS — Z00.00 ROUTINE GENERAL MEDICAL EXAMINATION AT A HEALTH CARE FACILITY: Primary | ICD-10-CM

## 2024-04-09 DIAGNOSIS — Z00.00 ROUTINE GENERAL MEDICAL EXAMINATION AT A HEALTH CARE FACILITY: ICD-10-CM

## 2024-04-09 LAB
25(OH)D3 SERPL-MCNC: 52.1 NG/ML (ref 30–100)
ALBUMIN SERPL-MCNC: 4.1 G/DL (ref 3.5–5.2)
ALBUMIN/GLOB SERPL: 1.4 G/DL
ALP SERPL-CCNC: 59 U/L (ref 39–117)
ALT SERPL W P-5'-P-CCNC: 17 U/L (ref 1–33)
ANION GAP SERPL CALCULATED.3IONS-SCNC: 7.6 MMOL/L (ref 5–15)
AST SERPL-CCNC: 16 U/L (ref 1–32)
BASOPHILS # BLD AUTO: 0.03 10*3/MM3 (ref 0–0.2)
BASOPHILS NFR BLD AUTO: 0.4 % (ref 0–1.5)
BILIRUB SERPL-MCNC: <0.2 MG/DL (ref 0–1.2)
BUN SERPL-MCNC: 12 MG/DL (ref 6–20)
BUN/CREAT SERPL: 14.5 (ref 7–25)
CALCIUM SPEC-SCNC: 9.3 MG/DL (ref 8.6–10.5)
CHLORIDE SERPL-SCNC: 106 MMOL/L (ref 98–107)
CHOLEST SERPL-MCNC: 159 MG/DL (ref 0–200)
CO2 SERPL-SCNC: 27.4 MMOL/L (ref 22–29)
CREAT SERPL-MCNC: 0.83 MG/DL (ref 0.57–1)
DEPRECATED RDW RBC AUTO: 45.4 FL (ref 37–54)
EGFRCR SERPLBLD CKD-EPI 2021: 86 ML/MIN/1.73
EOSINOPHIL # BLD AUTO: 0.44 10*3/MM3 (ref 0–0.4)
EOSINOPHIL NFR BLD AUTO: 5.6 % (ref 0.3–6.2)
ERYTHROCYTE [DISTWIDTH] IN BLOOD BY AUTOMATED COUNT: 13.5 % (ref 12.3–15.4)
GLOBULIN UR ELPH-MCNC: 3 GM/DL
GLUCOSE SERPL-MCNC: 87 MG/DL (ref 65–99)
HBA1C MFR BLD: 5.4 % (ref 4.8–5.6)
HCT VFR BLD AUTO: 41.3 % (ref 34–46.6)
HDLC SERPL-MCNC: 53 MG/DL (ref 40–60)
HGB BLD-MCNC: 13.3 G/DL (ref 12–15.9)
IMM GRANULOCYTES # BLD AUTO: 0.01 10*3/MM3 (ref 0–0.05)
IMM GRANULOCYTES NFR BLD AUTO: 0.1 % (ref 0–0.5)
LDLC SERPL CALC-MCNC: 93 MG/DL (ref 0–100)
LDLC/HDLC SERPL: 1.74 {RATIO}
LYMPHOCYTES # BLD AUTO: 1.93 10*3/MM3 (ref 0.7–3.1)
LYMPHOCYTES NFR BLD AUTO: 24.7 % (ref 19.6–45.3)
MCH RBC QN AUTO: 29 PG (ref 26.6–33)
MCHC RBC AUTO-ENTMCNC: 32.2 G/DL (ref 31.5–35.7)
MCV RBC AUTO: 90.2 FL (ref 79–97)
MONOCYTES # BLD AUTO: 0.34 10*3/MM3 (ref 0.1–0.9)
MONOCYTES NFR BLD AUTO: 4.4 % (ref 5–12)
NEUTROPHILS NFR BLD AUTO: 5.05 10*3/MM3 (ref 1.7–7)
NEUTROPHILS NFR BLD AUTO: 64.8 % (ref 42.7–76)
PLATELET # BLD AUTO: 225 10*3/MM3 (ref 140–450)
PMV BLD AUTO: 9.5 FL (ref 6–12)
POTASSIUM SERPL-SCNC: 3.9 MMOL/L (ref 3.5–5.2)
PROT SERPL-MCNC: 7.1 G/DL (ref 6–8.5)
RBC # BLD AUTO: 4.58 10*6/MM3 (ref 3.77–5.28)
SODIUM SERPL-SCNC: 141 MMOL/L (ref 136–145)
TRIGL SERPL-MCNC: 69 MG/DL (ref 0–150)
TSH SERPL DL<=0.05 MIU/L-ACNC: 0.81 UIU/ML (ref 0.27–4.2)
VIT B12 BLD-MCNC: 537 PG/ML (ref 211–946)
VLDLC SERPL-MCNC: 13 MG/DL (ref 5–40)
WBC NRBC COR # BLD AUTO: 7.8 10*3/MM3 (ref 3.4–10.8)

## 2024-04-09 PROCEDURE — 83036 HEMOGLOBIN GLYCOSYLATED A1C: CPT

## 2024-04-09 PROCEDURE — 82607 VITAMIN B-12: CPT

## 2024-04-09 PROCEDURE — 80050 GENERAL HEALTH PANEL: CPT

## 2024-04-09 PROCEDURE — 82306 VITAMIN D 25 HYDROXY: CPT

## 2024-04-09 PROCEDURE — 80061 LIPID PANEL: CPT

## 2024-04-09 PROCEDURE — 36415 COLL VENOUS BLD VENIPUNCTURE: CPT

## 2025-05-27 ENCOUNTER — TRANSCRIBE ORDERS (OUTPATIENT)
Dept: ADMINISTRATIVE | Facility: HOSPITAL | Age: 50
End: 2025-05-27
Payer: COMMERCIAL

## 2025-05-27 ENCOUNTER — LAB (OUTPATIENT)
Dept: LAB | Facility: HOSPITAL | Age: 50
End: 2025-05-27
Payer: COMMERCIAL

## 2025-05-27 DIAGNOSIS — Z00.00 ROUTINE MEDICAL EXAM: ICD-10-CM

## 2025-05-27 DIAGNOSIS — E55.9 VITAMIN D DEFICIENCY: ICD-10-CM

## 2025-05-27 DIAGNOSIS — Z00.00 ROUTINE MEDICAL EXAM: Primary | ICD-10-CM

## 2025-05-27 LAB
25(OH)D3 SERPL-MCNC: 43.2 NG/ML (ref 30–100)
ALBUMIN SERPL-MCNC: 4.3 G/DL (ref 3.5–5.2)
ALBUMIN/GLOB SERPL: 1.4 G/DL
ALP SERPL-CCNC: 76 U/L (ref 39–117)
ALT SERPL W P-5'-P-CCNC: 22 U/L (ref 1–33)
ANION GAP SERPL CALCULATED.3IONS-SCNC: 7.4 MMOL/L (ref 5–15)
AST SERPL-CCNC: 17 U/L (ref 1–32)
BASOPHILS # BLD AUTO: 0.05 10*3/MM3 (ref 0–0.2)
BASOPHILS NFR BLD AUTO: 0.6 % (ref 0–1.5)
BILIRUB SERPL-MCNC: 0.2 MG/DL (ref 0–1.2)
BUN SERPL-MCNC: 10 MG/DL (ref 6–20)
BUN/CREAT SERPL: 11.9 (ref 7–25)
CALCIUM SPEC-SCNC: 9.7 MG/DL (ref 8.6–10.5)
CHLORIDE SERPL-SCNC: 105 MMOL/L (ref 98–107)
CHOLEST SERPL-MCNC: 187 MG/DL (ref 0–200)
CO2 SERPL-SCNC: 27.6 MMOL/L (ref 22–29)
CREAT SERPL-MCNC: 0.84 MG/DL (ref 0.57–1)
DEPRECATED RDW RBC AUTO: 45.3 FL (ref 37–54)
EGFRCR SERPLBLD CKD-EPI 2021: 84.3 ML/MIN/1.73
EOSINOPHIL # BLD AUTO: 0.45 10*3/MM3 (ref 0–0.4)
EOSINOPHIL NFR BLD AUTO: 5.8 % (ref 0.3–6.2)
ERYTHROCYTE [DISTWIDTH] IN BLOOD BY AUTOMATED COUNT: 13 % (ref 12.3–15.4)
GLOBULIN UR ELPH-MCNC: 3.1 GM/DL
GLUCOSE SERPL-MCNC: 80 MG/DL (ref 65–99)
HBA1C MFR BLD: 5.4 % (ref 4.8–5.6)
HCT VFR BLD AUTO: 40.9 % (ref 34–46.6)
HDLC SERPL-MCNC: 63 MG/DL (ref 40–60)
HGB BLD-MCNC: 13.1 G/DL (ref 12–15.9)
IMM GRANULOCYTES # BLD AUTO: 0.02 10*3/MM3 (ref 0–0.05)
IMM GRANULOCYTES NFR BLD AUTO: 0.3 % (ref 0–0.5)
LDLC SERPL CALC-MCNC: 107 MG/DL (ref 0–100)
LDLC/HDLC SERPL: 1.67 {RATIO}
LYMPHOCYTES # BLD AUTO: 2 10*3/MM3 (ref 0.7–3.1)
LYMPHOCYTES NFR BLD AUTO: 25.6 % (ref 19.6–45.3)
MCH RBC QN AUTO: 29.6 PG (ref 26.6–33)
MCHC RBC AUTO-ENTMCNC: 32 G/DL (ref 31.5–35.7)
MCV RBC AUTO: 92.5 FL (ref 79–97)
MONOCYTES # BLD AUTO: 0.35 10*3/MM3 (ref 0.1–0.9)
MONOCYTES NFR BLD AUTO: 4.5 % (ref 5–12)
NEUTROPHILS NFR BLD AUTO: 4.95 10*3/MM3 (ref 1.7–7)
NEUTROPHILS NFR BLD AUTO: 63.2 % (ref 42.7–76)
PLATELET # BLD AUTO: 235 10*3/MM3 (ref 140–450)
PMV BLD AUTO: 8.7 FL (ref 6–12)
POTASSIUM SERPL-SCNC: 3.9 MMOL/L (ref 3.5–5.2)
PROT SERPL-MCNC: 7.4 G/DL (ref 6–8.5)
RBC # BLD AUTO: 4.42 10*6/MM3 (ref 3.77–5.28)
SODIUM SERPL-SCNC: 140 MMOL/L (ref 136–145)
TRIGL SERPL-MCNC: 94 MG/DL (ref 0–150)
TSH SERPL DL<=0.05 MIU/L-ACNC: 1.52 UIU/ML (ref 0.27–4.2)
VIT B12 BLD-MCNC: 748 PG/ML (ref 211–946)
VLDLC SERPL-MCNC: 17 MG/DL (ref 5–40)
WBC NRBC COR # BLD AUTO: 7.82 10*3/MM3 (ref 3.4–10.8)

## 2025-05-27 PROCEDURE — 80061 LIPID PANEL: CPT

## 2025-05-27 PROCEDURE — 82306 VITAMIN D 25 HYDROXY: CPT

## 2025-05-27 PROCEDURE — 80050 GENERAL HEALTH PANEL: CPT

## 2025-05-27 PROCEDURE — 36415 COLL VENOUS BLD VENIPUNCTURE: CPT

## 2025-05-27 PROCEDURE — 82607 VITAMIN B-12: CPT

## 2025-05-27 PROCEDURE — 83036 HEMOGLOBIN GLYCOSYLATED A1C: CPT
